# Patient Record
Sex: FEMALE | Race: WHITE | NOT HISPANIC OR LATINO | URBAN - METROPOLITAN AREA
[De-identification: names, ages, dates, MRNs, and addresses within clinical notes are randomized per-mention and may not be internally consistent; named-entity substitution may affect disease eponyms.]

---
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
## 2025-06-07 ENCOUNTER — NON-APPOINTMENT (OUTPATIENT)
Age: 20
End: 2025-06-07

## 2025-06-09 ENCOUNTER — NON-APPOINTMENT (OUTPATIENT)
Age: 20
End: 2025-06-09

## 2025-06-09 ENCOUNTER — APPOINTMENT (OUTPATIENT)
Dept: OTOLARYNGOLOGY | Facility: CLINIC | Age: 20
End: 2025-06-09
Payer: MEDICAID

## 2025-06-09 VITALS — BODY MASS INDEX: 19.49 KG/M2 | WEIGHT: 110 LBS | HEIGHT: 63 IN

## 2025-06-09 PROBLEM — H61.23 EXCESSIVE EAR WAX, BILATERAL: Status: ACTIVE | Noted: 2025-06-09

## 2025-06-09 PROBLEM — H60.90 OTITIS EXTERNA: Status: ACTIVE | Noted: 2025-06-09

## 2025-06-09 PROBLEM — Z00.00 ENCOUNTER FOR PREVENTIVE HEALTH EXAMINATION: Status: ACTIVE | Noted: 2025-06-09

## 2025-06-09 PROBLEM — H92.09 EARACHE: Status: ACTIVE | Noted: 2025-06-09

## 2025-06-09 PROCEDURE — 99203 OFFICE O/P NEW LOW 30 MIN: CPT | Mod: 25

## 2025-08-04 ENCOUNTER — APPOINTMENT (OUTPATIENT)
Dept: OBGYN | Facility: CLINIC | Age: 20
End: 2025-08-04